# Patient Record
Sex: FEMALE | Race: WHITE | Employment: FULL TIME | URBAN - METROPOLITAN AREA
[De-identification: names, ages, dates, MRNs, and addresses within clinical notes are randomized per-mention and may not be internally consistent; named-entity substitution may affect disease eponyms.]

---

## 2022-12-29 ENCOUNTER — OFFICE VISIT (OUTPATIENT)
Dept: URGENT CARE | Age: 62
End: 2022-12-29
Payer: OTHER GOVERNMENT

## 2022-12-29 VITALS
SYSTOLIC BLOOD PRESSURE: 150 MMHG | HEART RATE: 68 BPM | RESPIRATION RATE: 16 BRPM | TEMPERATURE: 98.7 F | OXYGEN SATURATION: 98 % | DIASTOLIC BLOOD PRESSURE: 80 MMHG | WEIGHT: 232 LBS

## 2022-12-29 DIAGNOSIS — R35.0 URINE FREQUENCY: ICD-10-CM

## 2022-12-29 DIAGNOSIS — N30.01 ACUTE CYSTITIS WITH HEMATURIA: Primary | ICD-10-CM

## 2022-12-29 LAB
BILIRUB UR QL STRIP: NEGATIVE
GLUCOSE UR-MCNC: NEGATIVE MG/DL
KETONES P FAST UR STRIP-MCNC: NEGATIVE MG/DL
PH UR STRIP: 5.5 [PH] (ref 4.6–8)
PROT UR QL STRIP: ABNORMAL
SP GR UR STRIP: 1.02 (ref 1–1.03)
UA UROBILINOGEN AMB POC: ABNORMAL (ref 0.2–1)
URINALYSIS CLARITY POC: ABNORMAL
URINALYSIS COLOR POC: ABNORMAL
URINE BLOOD POC: ABNORMAL
URINE LEUKOCYTES POC: ABNORMAL
URINE NITRITES POC: NEGATIVE

## 2022-12-29 PROCEDURE — 99202 OFFICE O/P NEW SF 15 MIN: CPT | Performed by: NURSE PRACTITIONER

## 2022-12-29 PROCEDURE — 81003 URINALYSIS AUTO W/O SCOPE: CPT | Performed by: NURSE PRACTITIONER

## 2022-12-29 RX ORDER — SERTRALINE HYDROCHLORIDE 100 MG/1
200 TABLET, FILM COATED ORAL DAILY
COMMUNITY

## 2022-12-29 RX ORDER — NITROFURANTOIN 25; 75 MG/1; MG/1
100 CAPSULE ORAL 2 TIMES DAILY
Qty: 14 CAPSULE | Refills: 0 | Status: SHIPPED | OUTPATIENT
Start: 2022-12-29 | End: 2023-01-05

## 2022-12-29 RX ORDER — TRAZODONE HYDROCHLORIDE 50 MG/1
TABLET ORAL
COMMUNITY

## 2022-12-29 RX ORDER — TAMSULOSIN HYDROCHLORIDE 0.4 MG/1
0.4 CAPSULE ORAL DAILY
Qty: 14 CAPSULE | Refills: 0 | Status: SHIPPED | OUTPATIENT
Start: 2022-12-29 | End: 2023-01-12

## 2022-12-29 RX ORDER — OMEPRAZOLE 40 MG/1
40 CAPSULE, DELAYED RELEASE ORAL DAILY
COMMUNITY

## 2022-12-29 RX ORDER — ROSUVASTATIN CALCIUM 10 MG/1
10 TABLET, COATED ORAL
COMMUNITY

## 2022-12-29 RX ORDER — LISINOPRIL AND HYDROCHLOROTHIAZIDE 12.5; 2 MG/1; MG/1
TABLET ORAL DAILY
COMMUNITY

## 2022-12-29 RX ORDER — BUPROPION HYDROCHLORIDE 100 MG/1
TABLET, EXTENDED RELEASE ORAL
COMMUNITY

## 2022-12-29 NOTE — PROGRESS NOTES
The history is provided by the patient. Urinary Frequency  This is a recurrent problem. The current episode started more than 2 days ago (4 days). The problem occurs hourly. The problem has not changed since onset. Associated symptoms include abdominal pain. Pertinent negatives include no chest pain and no shortness of breath. Nothing aggravates the symptoms. The symptoms are relieved by NSAIDs. She has tried aspirin for the symptoms. The treatment provided significant relief. Past Medical History:   Diagnosis Date    Endocrine disease     Hypertension         Past Surgical History:   Procedure Laterality Date    HX CHOLECYSTECTOMY      HX GYN           History reviewed. No pertinent family history. Social History     Socioeconomic History    Marital status: SINGLE     Spouse name: Not on file    Number of children: Not on file    Years of education: Not on file    Highest education level: Not on file   Occupational History    Not on file   Tobacco Use    Smoking status: Former     Types: Cigarettes    Smokeless tobacco: Never   Substance and Sexual Activity    Alcohol use: Not on file    Drug use: Not on file    Sexual activity: Not on file   Other Topics Concern    Not on file   Social History Narrative    Not on file     Social Determinants of Health     Financial Resource Strain: Not on file   Food Insecurity: Not on file   Transportation Needs: Not on file   Physical Activity: Not on file   Stress: Not on file   Social Connections: Not on file   Intimate Partner Violence: Not on file   Housing Stability: Not on file                ALLERGIES: Demerol [meperidine], Dilaudid [hydromorphone], Keflex [cephalexin], and Oxycodone    Review of Systems   Constitutional:  Positive for fatigue. Negative for activity change, appetite change, chills and fever. Respiratory:  Positive for cough. Negative for chest tightness and shortness of breath.     Cardiovascular:  Negative for chest pain, palpitations and leg swelling. Gastrointestinal:  Positive for abdominal pain and nausea. Negative for abdominal distention and diarrhea. Genitourinary:  Positive for difficulty urinating, dysuria, frequency and urgency. Negative for flank pain and hematuria. Musculoskeletal:  Negative for myalgias. Neurological:  Negative for dizziness, weakness and numbness. Vitals:    12/29/22 1156 12/29/22 1202   BP:  (!) 150/80   Pulse: 68    Resp: 16    Temp: 98.7 °F (37.1 °C)    SpO2: 98%    Weight: 232 lb (105.2 kg)        Physical Exam  Constitutional:       Appearance: Normal appearance. She is not ill-appearing or diaphoretic. Cardiovascular:      Rate and Rhythm: Normal rate and regular rhythm. Heart sounds: Normal heart sounds. Pulmonary:      Effort: Pulmonary effort is normal.      Breath sounds: Normal breath sounds. Abdominal:      General: Bowel sounds are normal.      Palpations: Abdomen is soft. There is no hepatomegaly or splenomegaly. Tenderness: There is no abdominal tenderness. There is no right CVA tenderness, left CVA tenderness, guarding or rebound. Negative signs include Duncan's sign, Rovsing's sign, psoas sign and obturator sign. Musculoskeletal:      Right lower leg: No edema. Left lower leg: No edema. Skin:     General: Skin is warm. Capillary Refill: Capillary refill takes less than 2 seconds. Coloration: Skin is not jaundiced. Findings: No rash. Neurological:      Mental Status: She is alert and oriented to person, place, and time. Psychiatric:         Mood and Affect: Mood normal.       MDM     Differential Diagnosis; Clinical Impression; Plan:     (N30.01) Acute cystitis with hematuria  (primary encounter diagnosis)  (R35.0) Urine frequency    Ordered UA and urine culture  Will treat for UTI pending urine culture  If urine culture is negative stop antibiotics  Symptom and presentation is also suggestive of stone. Will treat preventatively for potential stone. Patient Education:  Increase fluids, avoid caffeine and sugary drinks  Medication: Macrobid 100 2x daily for 7 days, and Tamsulosin 0.4mg daily for 2 weeks  F/U: for new or worsening symptoms. As discussed with pt.  Go to ER if unable to void, severe abd pain      Procedures

## 2022-12-29 NOTE — PATIENT INSTRUCTIONS
Patient Education:  Increase fluids, avoid caffeine and sugary drinks  Medication: Macrobid 100 2x daily for 7 days, and Tamsulosin 0.4mg daily for 2 weeks  F/U: for new or worsening symptoms. As discussed with pt.  Go to ER if unable to void, severe abd pain

## 2023-01-01 LAB
BACTERIA SPEC CULT: ABNORMAL
CC UR VC: ABNORMAL
SERVICE CMNT-IMP: ABNORMAL